# Patient Record
Sex: MALE | Race: WHITE | NOT HISPANIC OR LATINO | Employment: OTHER | URBAN - METROPOLITAN AREA
[De-identification: names, ages, dates, MRNs, and addresses within clinical notes are randomized per-mention and may not be internally consistent; named-entity substitution may affect disease eponyms.]

---

## 2019-09-27 ENCOUNTER — OFFICE VISIT (OUTPATIENT)
Dept: GASTROENTEROLOGY | Facility: CLINIC | Age: 84
End: 2019-09-27
Payer: COMMERCIAL

## 2019-09-27 VITALS — RESPIRATION RATE: 16 BRPM | BODY MASS INDEX: 16.12 KG/M2 | HEIGHT: 72 IN | WEIGHT: 119.06 LBS

## 2019-09-27 DIAGNOSIS — Z90.3 HISTORY OF GASTRECTOMY: ICD-10-CM

## 2019-09-27 DIAGNOSIS — R12 HEARTBURN: ICD-10-CM

## 2019-09-27 DIAGNOSIS — I35.0 AORTIC VALVE STENOSIS, ETIOLOGY OF CARDIAC VALVE DISEASE UNSPECIFIED: ICD-10-CM

## 2019-09-27 DIAGNOSIS — Z85.028 HISTORY OF GASTRIC CANCER: ICD-10-CM

## 2019-09-27 DIAGNOSIS — R13.14 PHARYNGOESOPHAGEAL DYSPHAGIA: Primary | ICD-10-CM

## 2019-09-27 PROCEDURE — 99999 PR PBB SHADOW E&M-NEW PATIENT-LVL IV: ICD-10-PCS | Mod: PBBFAC,,, | Performed by: NURSE PRACTITIONER

## 2019-09-27 PROCEDURE — 99999 PR PBB SHADOW E&M-NEW PATIENT-LVL IV: CPT | Mod: PBBFAC,,, | Performed by: NURSE PRACTITIONER

## 2019-09-27 PROCEDURE — 99204 OFFICE O/P NEW MOD 45 MIN: CPT | Mod: S$PBB,,, | Performed by: NURSE PRACTITIONER

## 2019-09-27 PROCEDURE — 99204 PR OFFICE/OUTPT VISIT, NEW, LEVL IV, 45-59 MIN: ICD-10-PCS | Mod: S$PBB,,, | Performed by: NURSE PRACTITIONER

## 2019-09-27 PROCEDURE — 99204 OFFICE O/P NEW MOD 45 MIN: CPT | Mod: PBBFAC,PO | Performed by: NURSE PRACTITIONER

## 2019-09-27 RX ORDER — FERROUS SULFATE 325(65) MG
325 TABLET, DELAYED RELEASE (ENTERIC COATED) ORAL
COMMUNITY

## 2019-09-27 RX ORDER — FUROSEMIDE 40 MG/1
40 TABLET ORAL
COMMUNITY
Start: 2019-03-13

## 2019-09-27 RX ORDER — METOPROLOL SUCCINATE 25 MG/1
25 TABLET, EXTENDED RELEASE ORAL
COMMUNITY
Start: 2019-03-13

## 2019-09-27 RX ORDER — PANTOPRAZOLE SODIUM 40 MG/1
40 TABLET, DELAYED RELEASE ORAL DAILY
Qty: 30 TABLET | Refills: 11 | Status: SHIPPED | OUTPATIENT
Start: 2019-09-27 | End: 2020-09-26

## 2019-09-27 RX ORDER — VIT C/E/ZN/COPPR/LUTEIN/ZEAXAN 250MG-90MG
2000 CAPSULE ORAL
COMMUNITY

## 2019-09-27 RX ORDER — ASPIRIN 81 MG/1
81 TABLET ORAL
COMMUNITY

## 2019-09-27 RX ORDER — ATORVASTATIN CALCIUM 80 MG/1
80 TABLET, FILM COATED ORAL DAILY
COMMUNITY

## 2019-09-27 RX ORDER — MAG HYDROX/ALUMINUM HYD/SIMETH 200-200-20
30 SUSPENSION, ORAL (FINAL DOSE FORM) ORAL DAILY PRN
COMMUNITY

## 2019-09-27 RX ORDER — PNV NO.95/FERROUS FUM/FOLIC AC 28MG-0.8MG
100 TABLET ORAL
COMMUNITY

## 2019-09-27 RX ORDER — DEXTROMETHORPHAN HYDROBROMIDE, GUAIFENESIN 5; 100 MG/5ML; MG/5ML
1 LIQUID ORAL
COMMUNITY

## 2019-09-27 RX ORDER — ASCORBIC ACID
GRANULES (GRAM) ORAL
COMMUNITY

## 2019-09-27 NOTE — PATIENT INSTRUCTIONS
Soft Diet  Your healthcare provider has prescribed a soft diet (also called gastrointestinal soft diet). This means eating foods that are soft, low in fiber, and easy to digest. This diet is for people with digestive problems. A soft diet provides foods that are easy to chew and swallow. Foods should be bite-size and very soft or moist. Follow your healthcare providers specific instructions about what foods and drinks you may have. The general guidelines below can help you get started on this diet.       Beverages  OK: Milk, tea, coffee, fruit juices, carbonated beverages, nutrition shakes, and drinks (Note: Thin liquids may be hard to swallow. They may need to be thickened.)  Avoid: None, unless they need to be thickened  Breads and crackers  OK: Refined white, wheat, or seedless rye bread; nichelle or soda crackers that have been moistened; plain rolls or bagels; very soft tortillas  Avoid: Whole-grain breads, rolls, or bagels with nuts, raisins, or seeds; crackers, croutons, taco shells  Cereals and grains  OK: Cooked cereals, plain dry cereals that have been moistened, plain macaroni, spaghetti, noodles, rice  Avoid: Whole-grain cereals and granola, or cereals containing bran, raisins, seeds or nuts; coconut; brown or wild rice  Desserts and sweets  OK: Moist cake; soft fruit pie with bottom crust only; soft cookies moistened in milk or other liquid; gelatin, custard, pudding, plain ice cream, plain sherbet, sugar, honey, clear jelly  Avoid: Pastries, desserts, and ice cream that have nuts, coconut, seeds, or dried fruit; popcorn; chips of any kind including potato chips and tortilla chips; jam, marmalade  Eggs and cheese  OK: Poached, soft boiled, or scrambled eggs; cottage cheese, ricotta cheese, cream cheese, cheese sauces, or cheese melted in other dishes  Avoid: Crisp fried eggs, cheese slices and cubes  Fruits  OK: Avocado, banana, baked peeled apple, applesauce, peeled ripe peaches or pears, canned fruit  (apricots, cherries, peaches, pears), melons  Avoid: Raw apple, dried fruits, coconut, pineapple, grapes, fruit radha, fruit snacks  Meat and fish  OK: All fresh meat, poultry, or fish that is cooked until tender  Avoid: Meat, fish, or poultry that is fried; tough or stringy meat including marin, sausage, bratwurst, jerky, corned beef  Other protein foods  OK: Tofu, baked beans, smooth peanut butter or other nut or seed butters  Avoid: Deep-fried tofu; crunchy peanut or other nut or seed butters; nuts or seeds that are whole or chopped  Soups  OK: All soups, but they may need to be thickened. Thin liquid may be too hard to swallow.  Avoid: Soups made with stringy meat pieces or chunky vegetables  Vegetables  OK: Peeled and well-cooked potatoes or sweet potatoes; fresh, cooked, canned, or frozen vegetables without seeds, skin, or coarse fiber  Avoid: Raw vegetables, deep-fried vegetables (such as tempura), and corn  Date Last Reviewed: 8/1/2016  © 6234-4398 [employer]. [address] [place] [address], [place] [address]. All rights reserved. This information is not intended as a substitute for professional medical care. Always follow your healthcare professional's instructions.

## 2019-09-27 NOTE — LETTER
September 27, 2019      [facility]  [address] [facility] [address]           [place] [facility]  [address] [place] [address]  Phone: [phone]          Patient: Jonathan Garcia   MR Number: [id number]   YOB: 1935   Date of Visit: 9/27/2019       Dear [facility]:    Thank you for referring Jonathan Garcia to me for evaluation. Attached you will find relevant portions of my assessment and plan of care.    If you have questions, please do not hesitate to call me. I look forward to following Jonathan Garcia along with you.    Sincerely,    Catalina Giles, NP    Enclosure  CC:  No Recipients    If you would like to receive this communication electronically, please contact [email or web address] or [phone] to request more information on [employer] access.    For providers and/or their staff who would like to refer a patient to [facility], please contact us through our one-stop-shop provider referral line, Jaquelin Nur, at [phone].    If you feel you have received this communication in error or would no longer like to receive these types of communications, please e-mail [email or web address]

## 2019-09-27 NOTE — PROGRESS NOTES
Subjective:       Patient ID: Jonathan Garcia is a 83 y.o. male, Body mass index is 16.15 kg/m².    Chief Complaint: Follow-up (EGD)      Patient is new to me[facility]    Gastroesophageal Reflux   He complains of belching, choking (denies having heimlich maneuver performed), dysphagia (trouble swallowing food and pills; denies trouble swallowing liquids) and heartburn. He reports no abdominal pain, no chest pain, no coughing, no early satiety, no hoarse voice or no nausea. This is a new problem. The current episode started more than 1 month ago (Started few months ago). The problem occurs frequently. The problem has been gradually worsening. The heartburn duration is several minutes. The heartburn is located in the substernum. The heartburn is of moderate intensity. The heartburn wakes him from sleep. The heartburn does not limit his activity. The heartburn changes with position. The symptoms are aggravated by lying down and certain foods. Risk factors include caffeine use, smoking/tobacco exposure and ETOH use. He has tried a histamine-2 antagonist (Currently: Zantac 75 mg otc daily PRN) for the symptoms. The treatment provided moderate relief. Past procedures do not include an abdominal ultrasound or an EGD. Hx of gastrectomy in 2014 due to gastric cancer.     Review of Systems   Constitutional: Negative for appetite change, fever and unexpected weight change.   HENT: Positive for trouble swallowing. Negative for hoarse voice.    Respiratory: Positive for choking (denies having heimlich maneuver performed). Negative for cough and shortness of breath.    Cardiovascular: Negative for chest pain.   Gastrointestinal: Positive for dysphagia (trouble swallowing food and pills; denies trouble swallowing liquids) and heartburn. Negative for abdominal pain, blood in stool, constipation, diarrhea, nausea and vomiting.   Genitourinary: Negative for difficulty urinating and dysuria.    Musculoskeletal: Negative for gait problem.   Skin: Negative for rash.   Neurological: Negative for speech difficulty.   Psychiatric/Behavioral: Negative for confusion.       Past Medical History:   Diagnosis Date    Aortic stenosis     Coronary artery disease     Hard of hearing     Hypertension     Stomach cancer       Past Surgical History:   Procedure Laterality Date    COLONOSCOPY  2013    GASTRECTOMY  2014      Family History   Problem Relation Age of Onset    Colon cancer Neg Hx       Wt Readings from Last 10 Encounters:   09/27/19 54 kg (119 lb 0.8 oz)              Reviewed prior medical records including endoscopy history (see surgical history).     Objective:      Physical Exam   Constitutional: He is oriented to person, place, and time. He appears cachectic. He is cooperative.   HENT:   Head: Normocephalic.   Right Ear: Decreased hearing is noted.   Left Ear: Decreased hearing is noted.   Eyes: Pupils are equal, round, and reactive to light.   Neck: Normal range of motion.   Cardiovascular: Normal rate and regular rhythm.   Murmur heard.  Pulmonary/Chest: Breath sounds normal. No respiratory distress. He has no wheezes.   Abdominal: Soft. Bowel sounds are normal. He exhibits no distension and no mass. There is no tenderness. There is no guarding.   Well healed surgical scar noted   Musculoskeletal: Normal range of motion.   Neurological: He is alert and oriented to person, place, and time.   Skin: Skin is warm and dry. No rash noted.   Non jaundiced   Psychiatric: He has a normal mood and affect. His speech is normal.         Assessment:       1. Pharyngoesophageal dysphagia    2. Heartburn    3. History of gastric cancer    4. History of gastrectomy    5. Aortic valve stenosis, etiology of cardiac valve disease unspecified           Plan:   All diagnoses and orders for this visit:    Pharyngoesophageal dysphagia  - Start: pantoprazole (PROTONIX) 40 MG tablet; Take 1 tablet (40 mg total) by  mouth once daily.  Dispense: 30 tablet; Refill: 11  - Schedule EGD, discussed procedure with patient and possible esophageal dilation may be performed during procedure if indicated, patient verbalized understanding  - Educated patient to eat smaller more frequent meals and to eat slowly and advised to eat a soft diet.  - Possible UGI/esophagram/esophageal manometry if symptoms persist    Heartburn  - Schedule EGD, discussed procedure with patient, including risks and benefits, patient verbalized understanding  - Start: pantoprazole (PROTONIX) 40 MG tablet; Take 1 tablet (40 mg total) by mouth once daily.  Dispense: 30 tablet; Refill: 11  - Take PPI in the morning 30-60 minutes before breakfast  - Educated patient on lifestyle modifications to help control/reduce reflux/abdominal pain including: avoid large meals, avoid eating within 2-3 hours of bedtime (avoid late night eating & lying down soon after eating), elevate head of bed if nocturnal symptoms are present, smoking cessation (if current smoker), & weight loss (if overweight).   - Educated to avoid known foods which trigger reflux symptoms & to minimize/avoid high-fat foods, chocolate, caffeine, citrus, alcohol, & tomato products.  - Advised to avoid/limit use of NSAID's, since they can cause GI upset, bleeding, and/or ulcers. If needed, take with food.     History of gastric cancer    History of gastrectomy    Aortic valve stenosis, etiology of cardiac valve disease unspecified   - Follow up with cardiology for continued evaluation and management    If no improvement in symptoms or symptoms worsen, call/follow-up at clinic or go to ER

## 2019-10-03 PROBLEM — R13.10 DYSPHAGIA: Status: ACTIVE | Noted: 2019-10-03

## 2019-10-10 ENCOUNTER — TELEPHONE (OUTPATIENT)
Dept: GASTROENTEROLOGY | Facility: CLINIC | Age: 84
End: 2019-10-10

## 2019-10-10 NOTE — TELEPHONE ENCOUNTER
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 Will fax pathology results to patient's PCP Dr. Goode at [facility]. Patient will need Oncology and Surgery referrals. He will also need CT Chest/Abdomen/Pelvis to evaluate any metastatic disease. Our GI office will call the PCP office as well to notify of results and plan.